# Patient Record
Sex: MALE | Race: OTHER
[De-identification: names, ages, dates, MRNs, and addresses within clinical notes are randomized per-mention and may not be internally consistent; named-entity substitution may affect disease eponyms.]

---

## 2018-05-10 ENCOUNTER — HOSPITAL ENCOUNTER (EMERGENCY)
Dept: HOSPITAL 15 - ER | Age: 48
Discharge: HOME | End: 2018-05-10
Payer: COMMERCIAL

## 2018-05-10 VITALS — HEIGHT: 71 IN | BODY MASS INDEX: 38.5 KG/M2 | WEIGHT: 275 LBS

## 2018-05-10 VITALS — SYSTOLIC BLOOD PRESSURE: 134 MMHG | DIASTOLIC BLOOD PRESSURE: 82 MMHG

## 2018-05-10 DIAGNOSIS — S46.911A: Primary | ICD-10-CM
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
## 2018-05-10 DIAGNOSIS — S29.011A: ICD-10-CM

## 2018-05-10 PROCEDURE — 93005 ELECTROCARDIOGRAM TRACING: CPT

## 2018-05-10 PROCEDURE — 73030 X-RAY EXAM OF SHOULDER: CPT

## 2018-05-10 PROCEDURE — 71101 X-RAY EXAM UNILAT RIBS/CHEST: CPT

## 2025-02-20 ENCOUNTER — HOSPITAL ENCOUNTER (EMERGENCY)
Dept: HOSPITAL 15 - ER | Age: 55
LOS: 1 days | Discharge: HOME | End: 2025-02-21
Payer: COMMERCIAL

## 2025-02-20 VITALS
HEART RATE: 65 BPM | SYSTOLIC BLOOD PRESSURE: 118 MMHG | DIASTOLIC BLOOD PRESSURE: 81 MMHG | RESPIRATION RATE: 16 BRPM | OXYGEN SATURATION: 98 % | TEMPERATURE: 98.3 F

## 2025-02-20 VITALS — WEIGHT: 258.38 LBS | BODY MASS INDEX: 36.17 KG/M2 | HEIGHT: 71 IN

## 2025-02-20 DIAGNOSIS — S61.412A: Primary | ICD-10-CM
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
## 2025-02-20 PROCEDURE — 99283 EMERGENCY DEPT VISIT LOW MDM: CPT

## 2025-02-20 PROCEDURE — 12001 RPR S/N/AX/GEN/TRNK 2.5CM/<: CPT

## 2025-02-20 RX ADMIN — LIDOCAINE HYDROCHLORIDE ONE ML: 10 INJECTION, SOLUTION INFILTRATION; PERINEURAL at 23:46

## 2025-02-20 NOTE — ED.PDOC
HPI Comments


Pt arrived in ER due to left finger laceration after cutting it with glass while

washing dishes. Pt VSS. Small lac present between 4th and 5th digit. Bleeding 

controlled.  DENIES NUMBNESS, WEAKNESS, OR ANY OTHER INJURY.


Time Seen by MD:  21:34


Reviewed Notes:  Nurses Notes, Medications, Allergies


Allergies:  


Coded Allergies:  


     NO KNOWN ALLERGIES (Unverified , 5/10/18)


Home Meds


Active Scripts


Amoxicillin & Pot Clavulanate (AUGMENTIN TABLET) 875 Mg Tb, 875 MG PO BID for 5 

Days, #10 TAB


   Prov:MARY COREA FNBARBARA         2/21/25


Information Source:  Patient


Complexity:  Intermediate


Laceration Length (cm):  1





Past Medical History


PAST MEDICAL HISTORY:  Denies


Surgical History:  Denies all surgeries





Family History


Family History:  Reviewed,noncontributory to illness, Unknown





Social History


Smoker:  Non-Smoker


Alcohol:  Denies ETOH Use


Drugs:  Denies Drug Use


Lives In:  Home





Constitutional:  denies: chills, diaphoresis, fatigue, fever, malaise, sweats, 

weakness, others


EENTM:  denies: blurred vision, double vision, ear bleeding, ear discharge, ear 

drainage, ear pain, ear ringing, eye pain, eye redness, hearing loss, mouth felix

n, mouth swelling, nasal discharge, nose bleeding, nose congestion, nose pain, 

photophobia, tearing, throat pain, throat swelling, voice changes, others


Respiratory:  denies: cough, hemoptysis, orthopnea, SOB at rest, shortness of 

breath, SOB with excertion, stridor, wheezing, others


Cardiovascular:  denies: chest pain, dizzy spells, diaphoresis, Dyspnea on 

exertion, edema, irregular heart beat, left arm pain, lightheadedness, 

palpitations, PND, syncope, others


Gastrointestinal:  denies: abdomen distended, abdominal pain, blood streaked 

bowels, constipated, diarrhea, dysphagia, difficulty swallowing, hematemesis, 

melena, nausea, poor appetite, poor fluid intake, rectal bleeding, rectal pain, 

vomiting, others


Genitourinary:  denies: burning, dysuria, flank pain, frequency, hematuria, 

incontinence, penile discharge, penile sore, pain, testicle pain, testicle 

swelling, urgency, others


Neurological:  denies: dizziness, fainting, headache, left sided numbness, left 

sided weakness, numbness, paresthesia, pre-existing deficit, right sided 

numbness, right sided weakness, seizure, speech problems, tingling, tremors, 

weakness, others


Musculoskeletal:  denies: back pain, gout, joint pain, joint swelling, muscle 

pain, muscle stiffness, neck pain, others


Integumetry:  reports: laceration (1.5cm Between 4th and 5th digit. Bleeding 

controlled); denies: bruises, change in color, change in hair/nails, dryness, 

lesions, lumps, rash, wounds, others


Allergic/Immunocompromised:  denies: Difficulty Healing, Frequent Infections, 

Hives, Itching, others


Hematologic/Lymphatic:  denies: anemia, blood clots, easy bleeding, easy 

bruising, swollen glands, others


Endocrine:  denies: excessive hunger, excessive sweating, excessive thirst, 

excessive urination, flushing, intolerance to cold, intolerance to heat, unexpla

ined weight gain, unexplained weight loss, others


Psychiatric:  denies: anxiety, bipolar disorder, depression, hopeless, panic 

disorder, schizophrenia, sleepless, suicidal, others





Physical Exam


General Appearance:  No Apparent Distress, Normal


HEENT:  Pharynx Normal


Neck:  Full Range of Motion, Non-Tender


Respiratory:  Lungs Clear, No Respiratory Distress, Normal Breath Sounds


Cardiovascular:  No Murmur, Normal Peripheral Pulses, Regular Rate/Rhythm


Breast Exam:  Deferred


Gastrointestinal:  Non Tender, Soft


Genitalia:  Deferred


Pelvic:  Deferred


Rectal:  Deferred


Extremities:  Normal capillary refill, Normal inspection, Normal range of 

motion, Non-tender, No pedal edema


Musculoskeletal :  


   Apperance:  Normal


Neurologic:  Alert, CNs II-XII nml as Tested, No Motor Deficits, Normal Affect, 

Normal Mood, No Sensory Deficits


Cerebellar Function:  Normal


Reflexes:  Normal


Skin:  Dry, Lacerations (etween 4th and 5th digit. Bleeding controlled), Normal 

Color, Warm


Lymphatic:  No Adenopathy





Was a procedure done?


Was a procedure done?:  Yes





Sedation


Sedation?:  No


Informed consent obtained:  Yes


Laceration Repair :  


Location


right hand between 4th and 5th digit.


Length


1.5CM


   Anesthetic:  Lidocaine, Without epi


   Laceration Repair Prep:  Saline


   Laceration Repair Wound Comple:  epidermis/dermis repair


   Laceration Repair:  Number of sutures (6), Simple


   Informed consent obtained:  Yes


   Risks, benefits, and alternati:  Yes


Notes


Patient tolerated procedure well minimal bleeding positive CSM





Differential diagnosis


Generic Laceration:  Retained Foriegn Body, Neurovascular Injury, Tendon Injury,

Avulsion





X-Ray, Labs, Meds, VS





                                   Vital Signs








  Date Time  Temp Pulse Resp B/P (MAP) Pulse Ox O2 Delivery O2 Flow Rate FiO2


 


2/20/25 22:00 98.3 65 16 118/81 (93) 98   





 98.3       


 


2/20/25 22:00      Room Air  


 


2/20/25 22:00 98.3 65 16 118/81 (93) 98   








X-Ray, Labs, Meds, VS Comment


SEE PROCEDURE NOTE.  SCRIPT PROPHYLACTIC ANTIBIOTICS.  SUTURES REMOVED IN 5-7 

DAYS. FOLLOW-UP WITH PCP IN 1 TO 2 DAYS.  TAKE MEDICATIONS AS PRESCRIBED.  

RETURN TO ED FOR ANY NEW OR WORSENING SYMPTOMS.


Time of 1ST Reevaluation:  00:44


Reevaluation 1ST:  Improved


Patient Education/Counseling:  Diagnosis, Treatment, Prognosis, Need For Follow 

Up


Family Education/Counseling:  No Family Present





Departure 1


Departure


Time of Disposition:  00:44


Impression:  


   Primary Impression:  


   Laceration of left hand without complication, excluding fingers


   Qualified Codes:  S61.412A - Laceration without foreign body of left hand, 

   initial encounter


Disposition:  01 HOME / SELF CARE / HOMELESS


Condition:  Stable


e-Prescriptions


Amoxicillin & Pot Clavulanate (AUGMENTIN TABLET) 875 Mg Tb


875 MG PO BID for 5 Days, #10 TAB


   Prov: MARY COREA         2/21/25


Discharged With:  Self





Critical Care Note


Critical Care Time?:  No





Stability


Stability form required:  MARY Spencer                Feb 20, 2025 22:11